# Patient Record
Sex: FEMALE | Race: WHITE | Employment: FULL TIME | URBAN - METROPOLITAN AREA
[De-identification: names, ages, dates, MRNs, and addresses within clinical notes are randomized per-mention and may not be internally consistent; named-entity substitution may affect disease eponyms.]

---

## 2024-09-30 ENCOUNTER — OFFICE VISIT (OUTPATIENT)
Age: 39
End: 2024-09-30
Payer: COMMERCIAL

## 2024-09-30 DIAGNOSIS — R27.9 LACK OF COORDINATION: ICD-10-CM

## 2024-09-30 DIAGNOSIS — R39.15 URGENCY OF URINATION: ICD-10-CM

## 2024-09-30 DIAGNOSIS — R10.2 PELVIC PAIN: ICD-10-CM

## 2024-09-30 DIAGNOSIS — M62.81 MUSCLE WEAKNESS (GENERALIZED): Primary | ICD-10-CM

## 2024-09-30 DIAGNOSIS — R10.84 GENERALIZED ABDOMINAL PAIN: ICD-10-CM

## 2024-09-30 PROCEDURE — 97161 PT EVAL LOW COMPLEX 20 MIN: CPT | Performed by: PHYSICAL THERAPIST

## 2024-09-30 PROCEDURE — 97140 MANUAL THERAPY 1/> REGIONS: CPT | Performed by: PHYSICAL THERAPIST

## 2024-09-30 PROCEDURE — 97530 THERAPEUTIC ACTIVITIES: CPT | Performed by: PHYSICAL THERAPIST

## 2024-09-30 NOTE — PROGRESS NOTES
Physical Therapy Evaluation  [facility], [facility] UROGYNECOLOGY AND PELVIC REHABILITATION   [address] [place] [address]  Dept: [phone]     Patient:  Fatoumata Newman  : 1985    Visit Date:  2024   Referring Provider:  CE Sauer  Time In: 0800  Time Out: 0900   Total time: 60 min      Treatment:  Treatment Charges Mins Units   [x] Manual Therapy (72790) 15 1   [x] Therapeutic Activity (80961) 30 2   []Neuromuscular [place] (51575)     [x] PT-Eval (49616, 90753, 04675) 15 1   [] Caregiver Training (75856)     [] Gait Training (83184)     []      [] PT Re-Eval (73257)     []      Total Treatment Time: 60 4      Diagnoses:    Diagnosis Orders   1. Muscle weakness (generalized)        2. Lack of coordination        3. Urgency of urination        4. Generalized abdominal pain        5. Pelvic pain             Precautions:  POTS,emergency hysterectomy , C section , second child by surrogate   Fatoumata Newman, 39 y.o., female presents today for PT evaluation of urinary urgency and feeling of incomplete bladder emptying. She states the symptoms have been present since her late teen years.  She feels urgency is a little worse since her  and hysterectomy as she has occasional drops in her underwear.  She feels that when she has an urge she cannot control it, however when she gets to the bathroom there is sometimes very little output.  She reports no bowel issues or straining.  No pain with sexual intercourse other than occasional soreness with movement and certain positions.  Patient reports an emergency  for the birth of her child in  with significant complications and having a an emergency hysterectomy a few days later.  She was in ICU for several days.  She reports having her second child by surrogate.  Patient is a  and works from home.     Pt's concerns are for urinary frequency,

## 2024-09-30 NOTE — PATIENT INSTRUCTIONS
After you urinate- stay there and relax your inner thighs, abdomen, and buttocks. Gently blow out 2 times like to make a pinwheel spin ( yoga breath- ribs and trunk gently expand).  DO NOT strain. You may be able to void again   Lie on your back, knees apart and out comfortably on pillows ( no tension in inner thighs). Gently take a inhalation breath where ribs expand, abdomen slowly raises ( pelvic bone \"wings\" spread apart) and then exhale gently.  2-3 minutes, 2-3 times a day

## 2024-10-07 ENCOUNTER — EVALUATION (OUTPATIENT)
Age: 39
End: 2024-10-07
Payer: COMMERCIAL

## 2024-10-07 DIAGNOSIS — R27.9 LACK OF COORDINATION: ICD-10-CM

## 2024-10-07 DIAGNOSIS — R39.15 URGENCY OF URINATION: ICD-10-CM

## 2024-10-07 DIAGNOSIS — M62.81 MUSCLE WEAKNESS (GENERALIZED): Primary | ICD-10-CM

## 2024-10-07 DIAGNOSIS — R10.84 GENERALIZED ABDOMINAL PAIN: ICD-10-CM

## 2024-10-07 DIAGNOSIS — R10.2 PELVIC PAIN: ICD-10-CM

## 2024-10-07 PROCEDURE — 97530 THERAPEUTIC ACTIVITIES: CPT | Performed by: PHYSICAL THERAPIST

## 2024-10-07 PROCEDURE — 97140 MANUAL THERAPY 1/> REGIONS: CPT | Performed by: PHYSICAL THERAPIST

## 2024-10-07 NOTE — PROGRESS NOTES
[facility] UROGYNECOLOGY AND PELVIC REHABILITATION   [address] [place] [address]  [address] [place] [address]  Dept: [phone]     Date of Visit: 10/7/2024   Patient: Fatoumata Newman   : 1985   Referring Physician: CE Sauer  Insurance: [employer]    Visit#:  2     Visit Diagnoses:   Diagnosis Orders   1. Muscle weakness (generalized)        2. Lack of coordination        3. Urgency of urination        4. Generalized abdominal pain        5. Pelvic pain              Subjective:  Fatoumata Newman  (: 1985  is a 39 y.o.  y.o. female ,.Pt states urgency is pig problem no control when she has to go she has to go now.  Pt states she has pain with intercourse at the opening once in it gets better.        Objective:   Tightness at  incision and bilateral upper and lower abdomen     Treatment:  Manual Therapy:  15 MIN  MFR/Cupping to bilateral upper and lower abdomen,  incision  There-Act:  15 MIN  Reviewed POC and HEP.  Pt aware of after care and possible side effects of Cupping     Assessment:  Pt has moderate tightness noted across bilateral abdomen and  incision decreased after MFR/cupping.  Pt is aware of after care and possible side effects of cupping.  Pt will continue to benefit from skilled PT to increase mobility and decrease pain      Goals Progress   PT GOALS     Short term ( 3 visits)   1.Decrease feeling of incomplete bladder emptying by 50% while at work or in a store for more comfortable IADLs   2.Decrease in urgency of urination by 50% during the day and night   3.Increase pelvic floor muscle strength by 50% for less UI with lift, cough, sneeze   4.Decrease pelvic./abdominal pain 50% in sitting, standing, lift, bend, sexual intercourse   5. Boley with HEP for isolating pelvic floor and/or control urge     Long term ( 6 visits)      1.Decrease in incomplete bladder emptying feeling by 80% for no

## 2024-10-15 ENCOUNTER — EVALUATION (OUTPATIENT)
Age: 39
End: 2024-10-15
Payer: COMMERCIAL

## 2024-10-15 DIAGNOSIS — R10.2 PELVIC PAIN: ICD-10-CM

## 2024-10-15 DIAGNOSIS — M62.81 MUSCLE WEAKNESS (GENERALIZED): Primary | ICD-10-CM

## 2024-10-15 DIAGNOSIS — R27.9 LACK OF COORDINATION: ICD-10-CM

## 2024-10-15 DIAGNOSIS — R39.15 URGENCY OF URINATION: ICD-10-CM

## 2024-10-15 DIAGNOSIS — R10.84 GENERALIZED ABDOMINAL PAIN: ICD-10-CM

## 2024-10-15 PROCEDURE — 97112 NEUROMUSCULAR REEDUCATION: CPT | Performed by: PHYSICAL THERAPIST

## 2024-10-15 PROCEDURE — 97140 MANUAL THERAPY 1/> REGIONS: CPT | Performed by: PHYSICAL THERAPIST

## 2024-10-15 PROCEDURE — 97530 THERAPEUTIC ACTIVITIES: CPT | Performed by: PHYSICAL THERAPIST

## 2024-10-16 NOTE — PROGRESS NOTES
[facility], [facility] UROGYNECOLOGY AND PELVIC REHABILITATION   [facility]5 [facility]  [address] [place] [address]  Dept: [phone]     Date of Visit: 10/16/2024   Patient: Fatoumata Newman   : 1985   Referring Physician: CE Sauer  Insurance: [employer]    Visit#:  4  Visit Diagnoses:       Diagnosis Orders   1. Muscle weakness (generalized)        2. Lack of coordination        3. Urgency of urination        4. Generalized abdominal pain        5. Pelvic pain              Subjective:  Fatoumata Newman  (: 1985  is a 39 y.o.  y.o. female ,.Pt states urgency isgetting better.  Pt is trying to go longer periods before voiding.    Pt states she has pain with intercourse at the opening once in it gets better did not have intercourse since last visit.  Pt states she was tender across lower abdomen after last visit for about 48 hours but increased mobility noted.        Objective:   Tightness at  incision and bilateral upper and lower abdomen and bilateral LB/buttocks    BFB  in sitting an with anterior pelvic tilt to recruit and initiate anterior PFM 7/10/7=12.2/10.0  fair contraction with slight VC for proper recruitment, fair endurance and fair breathing    Pt has difficulty with relaxation     Educated on relaxation breathing    Will benefit from internal MFR next visit            Treatment:  Manual Therapy:  30 MIN  MFR/Cupping to bilateral upper and lower abdomen,  incision and bilateral LB  There-Act:  15 MIN  Reviewed POC and HEP.  Pt aware of after care and possible side effects of Cupping     NEURO YUE 15min  BFB in sitting with anterior pelvic tilt, pt is unable to let go of contraction    Assessment:  Pt has moderate tightness noted across bilateral abdomen and  incision and bilateral LB decreased after MFR/cupping.  Attempted BFB however pt is unable to relax PFM even with relaxation breathing.  Pt is aware of

## 2024-10-23 ENCOUNTER — EVALUATION (OUTPATIENT)
Age: 39
End: 2024-10-23
Payer: COMMERCIAL

## 2024-10-23 DIAGNOSIS — R10.84 GENERALIZED ABDOMINAL PAIN: ICD-10-CM

## 2024-10-23 DIAGNOSIS — R10.2 PELVIC PAIN: ICD-10-CM

## 2024-10-23 DIAGNOSIS — M62.81 MUSCLE WEAKNESS (GENERALIZED): Primary | ICD-10-CM

## 2024-10-23 DIAGNOSIS — R39.15 URGENCY OF URINATION: ICD-10-CM

## 2024-10-23 DIAGNOSIS — R27.9 LACK OF COORDINATION: ICD-10-CM

## 2024-10-23 PROCEDURE — 97530 THERAPEUTIC ACTIVITIES: CPT | Performed by: PHYSICAL THERAPIST

## 2024-10-23 PROCEDURE — 97140 MANUAL THERAPY 1/> REGIONS: CPT | Performed by: PHYSICAL THERAPIST

## 2024-10-23 NOTE — PROGRESS NOTES
[facility], [facility] UROGYNECOLOGY AND PELVIC REHABILITATION   [facility]  [address] [place] [address]  Dept: [phone]     Date of Visit: 10/23/2024   Patient: Fatoumata Newman   : 1985   Referring Physician: CE Sauer  Insurance: A[employer]    Visit#:  4  Visit Diagnoses:       Diagnosis Orders   1. Muscle weakness (generalized)        2. Lack of coordination        3. Urgency of urination        4. Generalized abdominal pain        5. Pelvic pain                  Subjective:  Fatoumata Nemwan  (: 1985  is a 39 y.o.  y.o. female ,.Pt states urgency  better.  Pt is going longer periods before voiding.    Pt states she has had no pain with intercourse .   Pt states she was tender across lower abdomen and bilateral LB after last visit for about 12 hours but increased mobility noted.        Objective:   Tightness at  incision and bilateral upper and lower abdomen and bilateral LB/buttocks    Slight tightness noted at the opening and bilateral 1-3rd layers but no pain noted          Treatment:  Manual Therapy:  45 MIN  MFR/Cupping to bilateral upper and lower abdomen,  incision and bilateral LB  There-Act: 10 MIN  Reviewed POC and HEP.  Pt aware of after care and possible side effects of Cupping     Assessment:  Pt has moderate tightness noted across bilateral abdomen and  incision and bilateral LB decreased after MFR/cupping. Tightness noted at the opening and 1-3rd layers however no pain.  Pt is aware of after care and possible side effects of cupping.  Pt will continue to benefit from skilled PT to increase mobility and decrease pain      Goals Progress   PT GOALS     Short term ( 3 visits)   1.Decrease feeling of incomplete bladder emptying by 50% while at work or in a store for more comfortable IADLs   2.Decrease in urgency of urination by 50% during the day and night   3.Increase pelvic floor muscle strength

## 2024-10-30 ENCOUNTER — EVALUATION (OUTPATIENT)
Age: 39
End: 2024-10-30

## 2024-10-30 DIAGNOSIS — R10.2 PELVIC PAIN: ICD-10-CM

## 2024-10-30 DIAGNOSIS — R27.9 LACK OF COORDINATION: ICD-10-CM

## 2024-10-30 DIAGNOSIS — R39.15 URGENCY OF URINATION: ICD-10-CM

## 2024-10-30 DIAGNOSIS — R10.84 GENERALIZED ABDOMINAL PAIN: ICD-10-CM

## 2024-10-30 DIAGNOSIS — M62.81 MUSCLE WEAKNESS (GENERALIZED): Primary | ICD-10-CM

## 2024-10-30 NOTE — PROGRESS NOTES
[facility] UROGYNECOLOGY AND PELVIC REHABILITATION   [facility]5 [facility]  [address] [place] [address]  Dept: [phone]     Date of Visit: 10/30/2024   Patient: Fatoumata Newman   : 1985   Referring Physician: CE Sauer  Insurance: A[employer]    Visit#:  5  Visit Diagnoses:           Diagnosis Orders   1. Muscle weakness (generalized)        2. Lack of coordination        3. Urgency of urination        4. Generalized abdominal pain        5. Pelvic pain                  Subjective:  Fatoumata Newman  (: 1985  is a 39 y.o.  y.o. female ,.Pt states she had increased urgency over the weekend while at the [employer] game had to go to the bathroom all the time did drink alcohol but not a lot.        Pt states she has had no pain with intercourse Pt states she was tender across lower abdomen and bilateral LB after last visit for about 3 days didn't drink water after visit may be reason for tenderness pt will increase water after visit today      Objective:   Tightness at  incision and bilateral upper and lower abdomen    Slight tightness noted at the opening and bilateral 1-3rd layers but no pain noted    BFB in sitting with anterior pelvic tilt to recruit and initiate anterior PFM 7/10/7=12.8/8.0 fair contraction, fair endurance and fair breathing.  Better relaxation noted today until fatigue set in around rep 6           Treatment:  Manual Therapy:  30 MIN  MFR/Cupping to bilateral upper and lower abdomen,  incision and bilateral LB, internal MFR  There-Act: 10 MIN  Reviewed POC and HEP.  Pt aware of after care and possible side effects of Cupping better relaxation noted today with BFB    NEURO 15 min  BFB in sitting with anterior pelvic tilt.      Assessment:  Pt has moderate tightness noted across bilateral abdomen and  incision and bilateral LB decreased after MFR/cupping. Tightness noted at the opening and 1-3rd

## 2024-11-07 ENCOUNTER — EVALUATION (OUTPATIENT)
Age: 39
End: 2024-11-07

## 2024-11-07 DIAGNOSIS — R39.15 URGENCY OF URINATION: ICD-10-CM

## 2024-11-07 DIAGNOSIS — R10.2 PELVIC PAIN: ICD-10-CM

## 2024-11-07 DIAGNOSIS — R10.84 GENERALIZED ABDOMINAL PAIN: ICD-10-CM

## 2024-11-07 DIAGNOSIS — M62.81 MUSCLE WEAKNESS (GENERALIZED): Primary | ICD-10-CM

## 2024-11-07 DIAGNOSIS — R27.9 LACK OF COORDINATION: ICD-10-CM

## 2024-11-07 NOTE — PROGRESS NOTES
Physical Therapy Treatment Note   [facility], [facility] UROGYNECOLOGY AND PELVIC REHABILITATION   [address] [place] [address]  Dept: [phone]     Visit # 6    Patient: Fatoumata Newman  : 1985   DOS: 2024  Referring Physician:  CE Sauer    Time In: 0800  Time Out: 0855  Total Time (min): 55    Treatment:  Treatment Charges Mins Units   [x] Manual Therapy (50195) 30 2   X Therapeutic Activity (33401) 25 2   [] Self Care/Home Management Training (32291)     [] Therapeutic Exercise (73250)     [] Neuromuscular [place] (51691)     [] Gait Training (03873)     [] PT-Eval (12411, 14605, 20802)     [] PT Re-Eval (89634)     [] Caregiver Training (68236)     Total Treatment Time: 55 4     Subjective:  Pt's primary c/o:   Chief Complaint   Patient presents with    Urinary Urgency        Diagnosis:   Diagnosis Orders   1. Muscle weakness (generalized)        2. Lack of coordination        3. Urgency of urination        4. Pelvic pain        5. Generalized abdominal pain             Pt reports:  Urinary frequency symptoms are about the same.  She states when she double voids, she does not get any more urine out usually.  She states intercourse is a little less painful, however entry is still difficult.    Objective: Patient continues to sit with her legs crossed, posterior tilt, and guarding over the lower abdomen with upper chest breathing pattern.  She was reminded of the importance of attempting to get out of this position throughout the day to help with loosening of the abdomen and inner thighs and pelvic floor.  Patient does higher intensity core and hip work, and she was reminded that those higher intensity activities due to increased tension in the hips, lower abdomen, and pelvic floor.  Recommended she attempt to decrease the intensity or eliminate these exercises for a few weeks to see if it helps with her symptoms.  Reminded

## 2024-11-18 ENCOUNTER — EVALUATION (OUTPATIENT)
Age: 39
End: 2024-11-18
Payer: COMMERCIAL

## 2024-11-18 DIAGNOSIS — R10.84 GENERALIZED ABDOMINAL PAIN: ICD-10-CM

## 2024-11-18 DIAGNOSIS — R39.15 URGENCY OF URINATION: ICD-10-CM

## 2024-11-18 DIAGNOSIS — R27.9 LACK OF COORDINATION: ICD-10-CM

## 2024-11-18 DIAGNOSIS — R10.2 PELVIC PAIN: ICD-10-CM

## 2024-11-18 DIAGNOSIS — M62.81 MUSCLE WEAKNESS (GENERALIZED): Primary | ICD-10-CM

## 2024-11-18 PROCEDURE — 97530 THERAPEUTIC ACTIVITIES: CPT | Performed by: PHYSICAL THERAPIST

## 2024-11-18 PROCEDURE — 97140 MANUAL THERAPY 1/> REGIONS: CPT | Performed by: PHYSICAL THERAPIST

## 2024-11-18 NOTE — PROGRESS NOTES
[facility], [facility] UROGYNECOLOGY AND PELVIC REHABILITATION   [address] [place] [address]  [address] [place] [address]  Dept: [phone]     Date of Visit: 2024   Patient: Fatoumata Newman   : 1985   Referring Physician: CE Sauer  Insurance: A[employer]na/[employer]    Visit#:  7  Visit Diagnoses:         Diagnosis Orders   1. Muscle weakness (generalized)        2. Lack of coordination        3. Urgency of urination        4. Pelvic pain        5. Generalized abdominal pain                        Subjective:  Fatoumata Newman  (: 1985  is a 39 y.o.  y.o. female ,.Pt states she had increased urgency in the AM one morning and wet her self which has never happened and has not happened since.  Only did bicycling this past week not her normal workout.  Pt states Pain with intercourse is at the opening will ise dilator this week just came in.  When pressure was put on my left hip all pain went away.      Objective:   Tightness at left piriformis, PSIS, lateral hip     Slight tightness noted at the opening and bilateral 1-3rd layerswith left greater than right               Treatment:  Manual Therapy:  45 MIN  MFR/Cupping to bilateral  left LB, internal MFR  There-Act: 15 MIN  Reviewed POC and HEP.  Pt aware of after care and possible side effects of Cupping, Pt to trial Marshmallow root        Assessment:  Pt has moderate tightness noted across left LB decreased after MFR/cupping. Tightness noted at the opening and 1-3rd layers slight pain noted at left OI  Pt is aware of after care and possible side effects of cupping.  Pt will continue to benefit from skilled PT to increase mobility and decrease pain      Goals Progress   PT GOALS     Short term ( 3 visits)   1.Decrease feeling of incomplete bladder emptying by 50% while at work or in a store for more comfortable IADLs   2.Decrease in urgency of urination by 50% during the day and night   3.Increase pelvic floor

## 2024-12-06 ENCOUNTER — EVALUATION (OUTPATIENT)
Age: 39
End: 2024-12-06

## 2024-12-06 DIAGNOSIS — R10.84 GENERALIZED ABDOMINAL PAIN: ICD-10-CM

## 2024-12-06 DIAGNOSIS — M62.81 MUSCLE WEAKNESS: Primary | ICD-10-CM

## 2024-12-06 DIAGNOSIS — R39.15 URGENCY OF URINATION: ICD-10-CM

## 2024-12-06 DIAGNOSIS — R27.9 LACK OF COORDINATION: ICD-10-CM

## 2024-12-06 DIAGNOSIS — R10.2 PELVIC PAIN: ICD-10-CM

## 2024-12-06 NOTE — PROGRESS NOTES
[facility] UROGYNECOLOGY AND PELVIC REHABILITATION   [address] [place] [address]  Dept: [phone]     Discharge Report  Date:  2024    Patient Name:  Fatoumata Newman                                                                :  1985                                                                                                                                                   MRN: [id number]                                                                                 Visit #:  8     Evaluation Date: 24                                                     Referring Provider:  Dr Bea Sauer                        Diagnoses:   1. Muscle weakness  2. Lack of coordination  3. Urgency of urination  4. Generalized abdominal pain  5. Pelvic pain                                                                         Patient Status:  Pt reports she continues with incomplete emptying at times and also can have a strong urge that is not helped by urge suppression.    With further questioning, it was determined that pt's bladder has the ability to hold urine well through the night and has a good bladder contraction with complete emptying in the morning.    The times she does not empty well are the times she has not had much to drink or has gone frequently.  Pt feels she can completely relax on the toilet and does not feel she is holding tension when she voids.    Pt's bladder program was modified to have 4-5 consecutive days in a row of drinking 60 oz of water throughout the day and setting a timer to void every 2 hours to allow her bladder to fill well and have a strong enough contraction to completely empty.   Pt is aware of urge suppression (was performing too quickly)       Clinical Findings:   BFB in sitting and standing:  urge suppression performed too quickly.  Min v.c to perform with proper timing

## 2024-12-06 NOTE — PATIENT INSTRUCTIONS
Drink 60 oz spread throughout the day  Void at least every 2 hours (wait until at least the 2 hour pinky to void - set a timer)    When you feel the urge to void - do 5 kegels in a row with fist clench and relax to calm the urge.    Return to working out.